# Patient Record
Sex: MALE | Race: WHITE | Employment: OTHER | URBAN - METROPOLITAN AREA
[De-identification: names, ages, dates, MRNs, and addresses within clinical notes are randomized per-mention and may not be internally consistent; named-entity substitution may affect disease eponyms.]

---

## 2019-06-26 PROBLEM — M17.11 OSTEOARTHRITIS OF RIGHT KNEE: Status: ACTIVE | Noted: 2019-06-26

## 2019-12-19 ENCOUNTER — HOSPITAL ENCOUNTER (OUTPATIENT)
Dept: PHYSICAL THERAPY | Age: 79
Discharge: HOME | End: 2019-12-19
Payer: MEDICARE

## 2019-12-19 PROCEDURE — 97161 PT EVAL LOW COMPLEX 20 MIN: CPT

## 2019-12-19 PROCEDURE — 97140 MANUAL THERAPY 1/> REGIONS: CPT

## 2019-12-19 PROCEDURE — 97110 THERAPEUTIC EXERCISES: CPT

## 2019-12-19 NOTE — PROGRESS NOTES
[address] [facility] AT [place] [address], [place], [address]  Phone: [phone]   [phone]  PLAN OF CARE / [address] [place] [address] PHYSICAL THERAPY SERVICES  Patient Name: Nghia Farnsworth : 1940   Medical   Diagnosis: Leg pain, right [M79.604] Treatment Diagnosis: Leg pain, right [M79.604]   Onset Date: 2019     Referral Source: Ciera Loredo MD Start of [facility]): 2019   Prior Hospitalization: See medical history Provider #: [id number]   Prior Level of Function: Unlimited tolerance for sitting/standing/driving   Comorbidities: Arthritis, R TKA 2019, L1-L5 fusion 2013, L TKA 1997, L5-S1 fusion   Medications: Verified on Patient Summary List   The Plan of Care and following information is based on the information from the initial evaluation.   ===========================================================================================  Assessment / key information:  Pt is a [de-identified]y.o. year old male with subjective complaints of R hip/LE pain including N/T that started after R TKA in 2019. Pt states pain is worse with sitting, standing, and driving. States worst pain occurs with sitting and causes him pain and difficulty with initial steps afterwards. Current pain is rated as 2 to 9/10; localized to posterior R hip with radiation down posterior leg to heel/toes. FOTO score= 38/100 indicating 62% impairment to functional activities. Today's evaulation is significant for   1) POSTURE/OBSERVATION: R anteriorly rotated innominate     2) ROM:  Lumbar grossly 75% all planes with pain reported into L SB and hamstring tightness noted into flexion. Hips/knees WFL  3) STRENGTH:  BLE's grossly 5/5. Bridge limited to 75% with calf cramping. Fair TA with H/L march. 4) SPECIAL TESTS:  (+) R SLR at 70 deg. (+) seated fwd flexion R SI, seated hip flexion b/l SI. 5) ADDITIONAL FINDINGS: noted R gluteal atrophy.     These findings are supportive for diagnosis of R sciatica and SI joint dysfunction. Pt will be a good candidate for skilled PT to address these impairments and promote return to normal ADLs and functional mobility for improved quality of life.    ===========================================================================================  Eval Complexity: History HIGH Complexity :3+ comorbidities / personal factors will impact the outcome/ POC ;  Examination  MEDIUM Complexity : 3 Standardized tests and measures addressing body structure, function, activity limitation and / or participation in recreation ; Presentation LOW Complexity : Stable, uncomplicated ;  Decision Making MEDIUM Complexity : FOTO score of 26-74; Overall Complexity LOW   Problem List: pain affecting function, decrease ROM, decrease strength, impaired gait/ balance, decrease ADL/ functional abilitiies, decrease activity tolerance, decrease flexibility/ joint mobility and decrease transfer abilities   Treatment Plan may include any combination of the following: Therapeutic exercise, Therapeutic activities, Neuromuscular re-education, Physical agent/modality, Gait/balance training, Manual therapy, Patient education, Self Care training, Functional mobility training, Home safety training and Stair training  Patient / Family readiness to learn indicated by: asking questions, trying to perform skills and interest  Persons(s) to be included in education: patient (P)  Barriers to Learning/Limitations: None  Measures taken, if barriers to learning:    Patient Goal (s): \"sciatic pain reduced\"   Patient self reported health status: good  Rehabilitation Potential: good   Short Term Goals: To be accomplished in  4  treatments:  1. Pt will be educated in appropriate HEP to decrease pain, increase ROM, increase strength and return pt to PLOF. 2. Pt will note pain less than or equal to 5/10 at worst to allow increase in functional abilities.    3. Pt will report at least 25% improvement in frequency/intensity of right LE radicular symptoms with ADL's.  Long Term Goals: To be accomplished in  8-12  treatments:  1. Pt will improve FOTO score to >/= 50 to demo a significant improvement in functional activity tolerance. 2. Pt will achieve >/= +5 GROC in order to promote increased activity tolerance. 3. Pt will demonstrate bridge to 100% for improved axial stability with standing ADL's.   4. Patient will report max pain </= 2/10 with sitting activities to promote improved tolerance for driving. Frequency / Duration:   Patient to be seen  1-2  times per week for 8-12  treatments:  Patient / Caregiver education and instruction: self care, activity modification and exercises  Therapist Signature: LACIE Solares PT Date: 23/63/1919   Certification Period: 12/19/19 to 3/17/20 Time: 11:07 AM   ===========================================================================================  I certify that the above Physical Therapy Services are being furnished while the patient is under my care. I agree with the treatment plan and certify that this therapy is necessary. Physician Signature:        Date:       Time:     Please sign and return to [facility] or you may fax the signed copy to [phone]. Thank you.

## 2019-12-19 NOTE — PROGRESS NOTES
PHYSICAL THERAPY - DAILY TREATMENT NOTE    Patient Name: Khushi Longoria        Date: 2019  : 1940   yes Patient  Verified  Visit #:   1     Insurance: Payor: Sunitha Michelle / Plan: [place] MEDICARE PART A & B / Product Type: Medicare /      In time: 10:20 Out time: 11:20   Total Treatment Time: 60     Medicare/BCBS Time Tracking (below)   Total Timed Codes (min):  60 1:1 Treatment Time:  60     TREATMENT AREA =  Leg pain, right [M79.604]    SUBJECTIVE  Pain Level (on 0 to 10 scale):  6  / 10   Medication Changes/New allergies or changes in medical history, any new surgeries or procedures?    no  If yes, update Summary List   Subjective Functional Status/Changes:  []  No changes reported     See POC          OBJECTIVE    See exam on chart for details on objective findings          12 min Therapeutic Exercise:  [x]  See flow sheet   Rationale:      increase ROM and increase strength to improve the patients ability to change/maintain body positions     12 min Manual Therapy: METs for pubic clearing and R anteriorly rotated innominate (R glute, L psoas)   Rationale:      decrease pain, increase ROM, increase tissue extensibility and decrease trigger points to improve patient's ability to change/maintain body positions        Billed With/As:   [x] TE   [] TA   [] Neuro   [] Self Care Patient Education: [x] Review HEP    [] Progressed/Changed HEP based on:   [] positioning   [] body mechanics   [] transfers   [] heat/ice application    [x] other: Review HEP, handout created and issued to pt. as per chart. Pt educated in hip anatomy, function and dysfunction as related to diagnosis. Reviewed POC and goals. Pt reports understanding.        Other Objective/Functional Measures:    See POC     Post Treatment Pain Level (on 0 to 10) scale:   1  / 10     ASSESSMENT  Assessment/Changes in Function:     See POC     []  See Progress Note/Recertification   Patient will continue to benefit from skilled PT services to modify and progress therapeutic interventions, address functional mobility deficits, address ROM deficits, address strength deficits, analyze and address soft tissue restrictions, analyze and cue movement patterns, analyze and modify body mechanics/ergonomics, assess and modify postural abnormalities and instruct in home and community integration to attain remaining goals. Progress toward goals / Updated goals:    See POC     PLAN  []  Upgrade activities as tolerated yes Continue plan of care   []  Discharge due to :    []  Other:      Therapist: Barber Couch.  Aishwarya Arevalo PT    Date: 12/19/2019 Time: 8:52 AM     Future Appointments   Date Time Provider Dae Davalos   12/23/2019 11:15 AM [facility] [address]   12/31/2019  7:00 AM [facility] [address]

## 2019-12-23 ENCOUNTER — HOSPITAL ENCOUNTER (OUTPATIENT)
Dept: PHYSICAL THERAPY | Age: 79
Discharge: HOME | End: 2019-12-23
Payer: MEDICARE

## 2019-12-23 PROCEDURE — 97140 MANUAL THERAPY 1/> REGIONS: CPT

## 2019-12-23 PROCEDURE — 97110 THERAPEUTIC EXERCISES: CPT

## 2019-12-23 NOTE — PROGRESS NOTES
PT DAILY TREATMENT NOTE     Patient Name: Froy Cotton  Date:2019  : 1940  [x]  Patient  Verified  Payor: [place] MEDICARE / Plan: [place] MEDICARE PART A & B / Product Type: Medicare /    In time:11:16  Out time:12:09  Total Treatment Time (min): 53  Total Timed Codes (min): 43  1:1 Treatment Time (min): 43   Visit #: 2 of     Treatment Area: Leg pain, right [M79.604]    SUBJECTIVE  Pain Level (0-10 scale): 2/10  Any medication changes, allergies to medications, adverse drug reactions, diagnosis change, or new procedure performed?: [x] No    [] Yes (see summary sheet for update)  Subjective functional status/changes:   [] No changes reported  I feel the most pain in my buttock and leg down to my big toe that gets worse the longer that I sit, I even have to lean away from my right side after sitting for only about 15 minutes.      OBJECTIVE  Modality rationale: decrease pain and increase tissue extensibility to improve the patients ability to improve functional abilities    Min Type Additional Details    [] Estim: []Att   []Unatt  []TENS instruct                 []IFC  []Premod []NMES                       []Other:  []w/US   []w/ice   []w/heat  Position:  Location:    []  Traction: [] Cervical       []Lumbar                       [] Prone          []Supine                       []Intermittent   []Continuous Lbs:  [] before manual  [] after manual    []  Ultrasound: []Continuous   [] Pulsed                           []1MHz   []3MHz Location:  W/cm2:    []  Iontophoresis with dexamethasone         Location: [] Take home patch   [] In clinic   10 []  Ice     [x]  heat  []  Ice massage Position:sidelying   Location:right glut/hip    []  Vasopneumatic Device Pressure: [] lo [] med [] hi   Temp: [] lo [] med [] hi   [] Skin assessment post-treatment:  []intact []redness- no adverse reaction       []redness - adverse reaction:       31 min Therapeutic Exercise:  [x] See flow sheet :   Rationale: increase ROM and increase strength to improve the patients ability to improve functional abilities    12 min Manual Therapy:  Long axis right LE distraction, manual piriformis stretching and stick rolling/deep elbow releases to right glut/piriformis in sidelying    Rationale: decrease pain, increase ROM, increase tissue extensibility and decrease trigger points to improve functional mobility       min Patient Education: [x] Review HEP    [] Progressed/Changed HEP based on:   [] positioning   [] body mechanics   [] transfers   [] heat/ice application        Other Objective/Functional Measures:     Pain Level (0-10 scale) post treatment: 0    ASSESSMENT/Changes in Function: Pt tolerated all new therex well upon trial today with chief c/o increased right glut/piriformis pain with intermittent radicular symptoms down to great toe with prolonged sitting >15 minutes. Pt needs the most focus on right glut/hip mobility as well as lumbopelvic/core stabilization. Pt did report increased benefit after manual intervention with minimal to no symptoms at end of treatment today. Will continue to progress/advance patient within current POC as tolerated with monitoring symptoms. Patient will continue to benefit from skilled PT services to modify and progress therapeutic interventions, address functional mobility deficits, address ROM deficits, address strength deficits, analyze and address soft tissue restrictions, analyze and cue movement patterns, analyze and modify body mechanics/ergonomics and assess and modify postural abnormalities to attain remaining goals. []  See Plan of Care  []  See progress note/recertification  []  See Discharge Summary         Progress towards goals / Updated goals: · Short Term Goals: To be accomplished in  4  treatments:  1. Pt will be educated in appropriate HEP to decrease pain, increase ROM, increase strength and return pt to OF.    2.  Pt will note pain less than or equal to 5/10 at worst to allow increase in functional abilities. 3. Pt will report at least 25% improvement in frequency/intensity of right LE radicular symptoms with ADL's.      · Long Term Goals: To be accomplished in  8-12  treatments:  1. Pt will improve FOTO score to >/= 50 to demo a significant improvement in functional activity tolerance. 2. Pt will achieve >/= +5 GROC in order to promote increased activity tolerance.   3. Pt will demonstrate bridge to 100% for improved axial stability with standing ADL's.   4. Patient will report max pain </= 2/10 with sitting activities to promote improved tolerance for driving.       PLAN  [x]  Upgrade activities as tolerated     []  Continue plan of care  []  Update interventions per flow sheet       []  Discharge due to:_  []  Other:_      Raúl Lynn PTA 12/23/2019  11:15 AM      Future Appointments   Date Time Provider Dae Davalos   12/31/2019  7:00 [facility]

## 2019-12-31 ENCOUNTER — HOSPITAL ENCOUNTER (OUTPATIENT)
Dept: PHYSICAL THERAPY | Age: 79
Discharge: HOME | End: 2019-12-31
Payer: MEDICARE

## 2019-12-31 PROCEDURE — 97110 THERAPEUTIC EXERCISES: CPT

## 2019-12-31 PROCEDURE — 97140 MANUAL THERAPY 1/> REGIONS: CPT

## 2019-12-31 NOTE — PROGRESS NOTES
PT DAILY TREATMENT NOTE     Patient Name: Anthony Vincent  Date:2019  : 1940  [x]  Patient  Verified  Payor: VA MEDICARE / Plan: VA MEDICARE PART A & B / Product Type: Medicare /    In time:7:00  Out time:8:13  Total Treatment Time (min): 73  Total Timed Codes (min): 63  1:1 Treatment Time (min): 45   Visit #: 3 of     Treatment Area: Leg pain, right [M79.604]    SUBJECTIVE  Pain Level (0-10 scale): 0  Any medication changes, allergies to medications, adverse drug reactions, diagnosis change, or new procedure performed?: [x] No    [] Yes (see summary sheet for update)  Subjective functional status/changes:   [] No changes reported  My leg felt better for pretty good for the rest of the day after I was here last time until everything went back to the same, but I guess that it is going to take a while to see a noticeable improvement.     OBJECTIVE  Modality rationale: decrease pain and increase tissue extensibility to improve the patients ability to improve functional mobility    Min Type Additional Details    [] Estim: []Att   []Unatt  []TENS instruct                 []IFC  []Premod []NMES                       []Other:  []w/US   []w/ice   []w/heat  Position:  Location:    []  Traction: [] Cervical       []Lumbar                       [] Prone          []Supine                       []Intermittent   []Continuous Lbs:  [] before manual  [] after manual    []  Ultrasound: []Continuous   [] Pulsed                           []1MHz   []3MHz Location:  W/cm2:    []  Iontophoresis with dexamethasone         Location: [] Take home patch   [] In clinic   10 []  Ice     [x]  heat  []  Ice massage Position: sidelying  Location:right glut/hip    []  Vasopneumatic Device Pressure: [] lo [] med [] hi   Temp: [] lo [] med [] hi   [] Skin assessment post-treatment:  []intact []redness- no adverse reaction       []redness - adverse reaction:       48 min Therapeutic Exercise:  [x] See flow sheet : Rationale: increase ROM and increase strength to improve the patients ability to improve functional abilities    15 min Manual Therapy:  Long axis right LE distraction, manual piriformis and sidelying hip flexor/quad combo stretching, stick rolling/deep elbow releases to right glut/piriformis in sidelying    Rationale: decrease pain, increase ROM, increase tissue extensibility and decrease trigger points to improve functional mobility           min Patient Education: [x] Review HEP    [] Progressed/Changed HEP based on:   [] positioning   [] body mechanics   [] transfers   [] heat/ice application        Other Objective/Functional Measures:     Pain Level (0-10 scale) post treatment: 0    ASSESSMENT/Changes in Function: Pt presented with increased relief for the remainder of the day following last treatment until symptoms returned to the same level. Pt was also able to advance to addition of mini band side steps as well as increasing to theraband resistance with sidelying clamshells with min to mod challenge today. Pt however, presented with noticeable restriction/limitation with manaul sidelying hip flexor/quad combo stretching with manual intervention today. Will continue to progress/advance patient within current POC as tolerated with monitoring symptoms. Patient will continue to benefit from skilled PT services to modify and progress therapeutic interventions, address functional mobility deficits, address ROM deficits, address strength deficits, analyze and address soft tissue restrictions, analyze and cue movement patterns, analyze and modify body mechanics/ergonomics and assess and modify postural abnormalities to attain remaining goals. []  See Plan of Care  []  See progress note/recertification  []  See Discharge Summary         Progress towards goals / Updated goals: · Short Term Goals:  To be accomplished in  4  treatments:  1.  Pt will be educated in appropriate HEP to decrease pain, increase ROM, increase strength and return pt to PLOF. 19: Met   2. Pt will note pain less than or equal to 5/10 at worst to allow increase in functional abilities. 3. Pt will report at least 25% improvement in frequency/intensity of right LE radicular symptoms with ADL's.      · Long Term Goals: To be accomplished in  8-12  treatments:  1. Pt will improve FOTO score to >/= 50 to demo a significant improvement in functional activity tolerance. 2. Pt will achieve >/= +5 GROC in order to promote increased activity tolerance. 3. Pt will demonstrate bridge to 100% for improved axial stability with standing ADL's.   4. Patient will report max pain </= 2/10 with sitting activities to promote improved tolerance for driving.     PLAN  [x]  Upgrade activities as tolerated     []  Continue plan of care  []  Update interventions per flow sheet       []  Discharge due to:_  []  Other:_      Antwan Hathaway PTA 2019  7:03 AM      Future Appointments   Date Time Provider Dae Davalos   1/3/2020 11:45 AM Steven Ruiz, PT [facility]   2020  9:45 AM Arletta Silver, PTA [facility]   2020 10:00 AM Arletta Silver, PTA [facility]   2020  2:15 PM Arletta Silver, PTA [facility]   2020  8:30 AM Arletta Silver, PTA [facility]   2020  9:45 AM Arletta Silver, PTA [facility]   2020  8:30 AM Arletta Silver, PTA [facility]   2020  9:30 AM Marcy Rocha., PT [facility]   2020 10:45 AM Arletta Silver, PTA [facility]

## 2020-01-03 ENCOUNTER — HOSPITAL ENCOUNTER (OUTPATIENT)
Dept: PHYSICAL THERAPY | Age: 80
Discharge: HOME | End: 2020-01-03
Payer: MEDICARE

## 2020-01-03 PROCEDURE — 97140 MANUAL THERAPY 1/> REGIONS: CPT

## 2020-01-03 NOTE — PROGRESS NOTES
PHYSICAL THERAPY - DAILY TREATMENT NOTE    Patient Name: Katie Villa        Date: 1/3/2020  : 1940   yes Patient  Verified  Visit #:   4     Insurance: Payor: Corona Michelle / Plan: [place] MEDICARE PART A & B / Product Type: Medicare /      In time: 11:48 Out time: 12:39   Total Treatment Time: 51     Medicare/BCBS Time Tracking (below)   Total Timed Codes (min):  41 1:1 Treatment Time:  15     TREATMENT AREA =  Leg pain, right [M79.604]    SUBJECTIVE  Pain Level (on 0 to 10 scale):  3-4  / 10   Medication Changes/New allergies or changes in medical history, any new surgeries or procedures?    no  If yes, update Summary List   Subjective Functional Status/Changes:  []  No changes reported     Pt reports no significant change in his sx thus far. Reports relief after sessions lasting that day but wakes w/ sx returned to his normal.           OBJECTIVE  Modalities Rationale:     decrease pain and increase tissue extensibility to improve patient's ability to complete ADLs   min [] Estim, type/location:                                      []  att     []  unatt     []  w/US     []  w/ice    []  w/heat    min []  Mechanical Traction: type/lbs                   []  pro   []  sup   []  int   []  cont    []  before manual    []  after manual    min []  Ultrasound, settings/location:      min []  Iontophoresis w/ dexamethasone, location:                                               []  take home patch       []  in clinic   10 min []  Ice     [x]  Heat    location/position:  To the R hip in L SL    min []  Vasopneumatic Device, press/temp:     min []  Other:    [x] Skin assessment post-treatment (if applicable):    [x]  intact    []  redness- no adverse reaction     []redness - adverse reaction:        31 min Therapeutic Exercise:  [x]  See flow sheet   Rationale:      increase ROM and increase strength to improve the patients ability to ambulate, transfer     10 min Manual Therapy: STM/TPR to R piriformis and glute med, MET for R ant innom, supine sciatic n glides   Rationale:      decrease pain, increase ROM, increase tissue extensibility and decrease trigger points to improve patient's ability to complete ADLs    Billed With/As:   [x] TE   [] TA   [] Neuro   [] Self Care Patient Education: [x] Review HEP    [] Progressed/Changed HEP based on:   [] positioning   [] body mechanics   [] transfers   [] heat/ice application    [] other:      Other Objective/Functional Measures:    5' 1:1 TE (NC)    (+) SLR at 70 deg  ttp to R glute med     Post Treatment Pain Level (on 0 to 10) scale:   2  / 10     ASSESSMENT  Assessment/Changes in Function:     Sx slightly reduced post tx session. Advised continuation of HEP as rx in order to progress sx     []  See Progress Note/Recertification   Patient will continue to benefit from skilled PT services to modify and progress therapeutic interventions, address functional mobility deficits, address ROM deficits, address strength deficits, analyze and address soft tissue restrictions, analyze and cue movement patterns, analyze and modify body mechanics/ergonomics, assess and modify postural abnormalities and instruct in home and community integration to attain remaining goals. Progress toward goals / Updated goals: · Short Term Goals: To be accomplished in  4  treatments:  1.  Pt will be educated in appropriate HEP to decrease pain, increase ROM, increase strength and return pt to PLOF. 12/31/19: Met   2. Pt will note pain less than or equal to 5/10 at worst to allow increase in functional abilities. 3. Pt will report at least 25% improvement in frequency/intensity of right LE radicular symptoms with ADL's. 1/3/20: goal not met, pt notes no improvement in sx     · Long Term Goals: To be accomplished in  8-12  treatments:  1. Pt will improve FOTO score to >/= 50 to demo a significant improvement in functional activity tolerance.   2. Pt will achieve >/= +5 GROC in order to promote increased activity tolerance. 3. Pt will demonstrate bridge to 100% for improved axial stability with standing ADL's.   4. Patient will report max pain </= 2/10 with sitting activities to promote improved tolerance for driving.      PLAN  []  Upgrade activities as tolerated yes Continue plan of care   []  Discharge due to :    []  Other:      Therapist: Taisha León, PT    Date: 1/3/2020 Time: 12:47 PM     Future Appointments   Date Time Provider Dae Davalos   1/6/2020  9:45 AM Ladene Furry, PTA [facility] [address]   1/9/2020 10:00 AM Ladene Furry, PTA [facility] [address]   1/13/2020  2:15 PM Ladene Furry, PTA [facility] [address]   1/16/2020  8:30 AM Ladene Furry, PTA [facility] [address]   1/20/2020  9:45 AM Ladene Furry, PTA [facility] [address]   1/23/2020  8:30 AM Ladene Furry, PTA [facility] [address]   1/27/2020  9:30 AM Mandyleia Gunderson., PT [facility] [address]   1/30/2020 10:45 AM Ladene Furry, PTA [facility] [address]

## 2020-01-06 ENCOUNTER — HOSPITAL ENCOUNTER (OUTPATIENT)
Dept: PHYSICAL THERAPY | Age: 80
Discharge: HOME | End: 2020-01-06
Payer: MEDICARE

## 2020-01-06 PROCEDURE — 97140 MANUAL THERAPY 1/> REGIONS: CPT

## 2020-01-06 PROCEDURE — 97110 THERAPEUTIC EXERCISES: CPT

## 2020-01-06 NOTE — PROGRESS NOTES
PT DAILY TREATMENT NOTE     Patient Name: Ese Colby  Date:2020  : 1940  [x]  Patient  Verified  Payor: [place] MEDICARE / Plan: [place] MEDICARE PART A & B / Product Type: Medicare /    In time:9:47  Out time:10:56  Total Treatment Time (min): 69  Total Timed Codes (min): 59  1:1 Treatment Time (min): 43   Visit #: 5 of     Treatment Area: Leg pain, right [M79.604]    SUBJECTIVE  Pain Level (0-10 scale): 0  Any medication changes, allergies to medications, adverse drug reactions, diagnosis change, or new procedure performed?: [x] No    [] Yes (see summary sheet for update)  Subjective functional status/changes:   [] No changes reported  My back and leg is feeling pretty good today, I really haven't had any pain to speak, but I did have the usual pain over the weekend. I also feel like my right leg wants to buckle or give out when I am going up the stairs.      OBJECTIVE  Modality rationale: decrease pain and increase tissue extensibility to improve the patients ability to improve functional abilities   Min Type Additional Details    [] Estim: []Att   []Unatt  []TENS instruct                 []IFC  []Premod []NMES                       []Other:  []w/US   []w/ice   []w/heat  Position:  Location:    []  Traction: [] Cervical       []Lumbar                       [] Prone          []Supine                       []Intermittent   []Continuous Lbs:  [] before manual  [] after manual    []  Ultrasound: []Continuous   [] Pulsed                           []1MHz   []3MHz Location:  W/cm2:    []  Iontophoresis with dexamethasone         Location: [] Take home patch   [] In clinic   10 []  Ice     [x]  heat  []  Ice massage Position:sidelying   Location:right glut/hip    []  Vasopneumatic Device Pressure: [] lo [] med [] hi   Temp: [] lo [] med [] hi   [] Skin assessment post-treatment:  []intact []redness- no adverse reaction       []redness - adverse reaction:       44 min Therapeutic Exercise:  [x] See flow sheet :   Rationale: increase ROM and increase strength to improve the patients ability to improve functional abilities     15 min Manual Therapy:  Long axis right LE distraction, manual piriformis stretching, stick rolling/deep elbow releases to right glut/piriformis in sidelying    Rationale: decrease pain, increase ROM, increase tissue extensibility and decrease trigger points to improve functional mobility         min Patient Education: [x] Review HEP    [] Progressed/Changed HEP based on:   [] positioning   [] body mechanics   [] transfers   [] heat/ice application        Other Objective/Functional Measures:     Pain Level (0-10 scale) post treatment: 0    ASSESSMENT/Changes in Function: Pt presented with minimal right lumbosacral or LE radicular pain today, but chief c/o intermittent R LE instability/buckling especially with ascending stairs since last treatment. LE weakness/instability was addressed with addition of SLR's as well as ability to tolerate increased resistance with miniband side stepping and addition of level 1 dead bug stabs with min to mod challenge today. Will continue to progress/advance patient within current POC as tolerated with monitoring symptoms. Patient will continue to benefit from skilled PT services to modify and progress therapeutic interventions, address functional mobility deficits, address ROM deficits, address strength deficits, analyze and address soft tissue restrictions, analyze and cue movement patterns, analyze and modify body mechanics/ergonomics and assess and modify postural abnormalities to attain remaining goals. []  See Plan of Care  []  See progress note/recertification  []  See Discharge Summary         Progress towards goals / Updated goals: · Short Term Goals: To be accomplished in  4  treatments:  1.  Pt will be educated in appropriate HEP to decrease pain, increase ROM, increase strength and return pt to PLOF. 12/31/19: Met   2.  Pt will note pain less than or equal to 5/10 at worst to allow increase in functional abilities. 3. Pt will report at least 25% improvement in frequency/intensity of right LE radicular symptoms with ADL's. 1/3/20: goal not met, pt notes no improvement in sx     · Long Term Goals: To be accomplished in  8-12  treatments:  1. Pt will improve FOTO score to >/= 50 to demo a significant improvement in functional activity tolerance. 2. Pt will achieve >/= +5 GROC in order to promote increased activity tolerance. 3. Pt will demonstrate bridge to 100% for improved axial stability with standing ADL's.   4. Patient will report max pain </= 2/10 with sitting activities to promote improved tolerance for driving.     PLAN  [x]  Upgrade activities as tolerated     []  Continue plan of care  []  Update interventions per flow sheet       []  Discharge due to:_  []  Other:_      Anna Magaña PTA 1/6/2020  9:51 AM      Future Appointments   Date Time Provider Dae Davalos   1/9/2020 10:00 AM Bonnita Kussmaul, PTA [facility]   1/13/2020  2:15 PM Bonnita Kussmaul, PTA [facility]   1/16/2020  8:30 AM Bonnita Kussmaul, PTA [facility]   1/20/2020  9:45 AM Bonnita Kussmaul, PTA [facility]   1/23/2020  8:30 AM Bonnita Kussmaul, PTA [facility]   1/27/2020  9:30 AM Kamaljit Ford, PT [facility]   1/30/2020 10:45 AM Bonnita Kussmaul, PTA [facility]

## 2020-01-09 ENCOUNTER — HOSPITAL ENCOUNTER (OUTPATIENT)
Dept: PHYSICAL THERAPY | Age: 80
Discharge: HOME | End: 2020-01-09
Payer: MEDICARE

## 2020-01-09 PROCEDURE — 97110 THERAPEUTIC EXERCISES: CPT

## 2020-01-09 PROCEDURE — 97140 MANUAL THERAPY 1/> REGIONS: CPT

## 2020-01-09 NOTE — PROGRESS NOTES
PHYSICAL THERAPY - DAILY TREATMENT NOTE    Patient Name: Jeimy Rader        Date: 2020  : 1940   yes Patient  Verified  Visit #:     Insurance: Payor: [employer] / Plan: [place] MEDICARE PART A & B / Product Type: Medicare /      In time: 9:55 AM Out time: 11:08   Total Treatment Time: 73     Medicare/BCBS Time Tracking (below)   Total Timed Codes (min):  63 1:1 Treatment Time:  63     TREATMENT AREA =  Leg pain, right [M79.604]    SUBJECTIVE  Pain Level (on 0 to 10 scale):  5  / 10- R knee   Medication Changes/New allergies or changes in medical history, any new surgeries or procedures?    no  If yes, update Summary List   Subjective Functional Status/Changes:  []  No changes reported     Pt c/o significant increase in pain 2 days ago without known cause. Pt states pain was up to 8/10 and he had a lot of pain and difficulty trying to lie down to sleep. States his R knee has been more irritated since last session and weak to ascend stairs. Pt notes constant numbness to R great toe and 2nd toe; unchanged since SOC.   Sitting tolerance ~ 5-10 min firm surface; up to 30 min in movie theatre before increased R buttock pain and has to get up         OBJECTIVE  Modalities Rationale:     decrease pain and increase tissue extensibility to improve patient's ability to perform functional mobility   min [] Estim, type/location:                                      []  att     []  unatt     []  w/US     []  w/ice    []  w/heat    min []  Mechanical Traction: type/lbs                   []  pro   []  sup   []  int   []  cont    []  before manual    []  after manual    min []  Ultrasound, settings/location:      min []  Iontophoresis w/ dexamethasone, location:                                               []  take home patch       []  in clinic    min []  Ice     []  Heat    location/position:     min []  Vasopneumatic Device, press/temp:     min []  Other:    [] Skin assessment post-treatment (if applicable):    []  intact    []  redness- no adverse reaction     []redness - adverse reaction:        51 min Therapeutic Exercise:  [x]  See flow sheet   Rationale:      increase ROM and increase strength to improve the patients ability to tolerate functional mobility/ADL's     12 min Manual Therapy: STM R l/s paraspinals, piriformis, iliopsoas. Manual P/A hip mobs, Hip IR stretching, quad stretching. METs for pubic clearing, R anteriorly rotated innominate (R glute, L psoas)   Rationale:      decrease pain, increase ROM, increase tissue extensibility and decrease trigger points to improve patient's ability to perform functional mobility        Billed With/As:   [x] TE   [] TA   [] Neuro   [] Self Care Patient Education: [x] Review HEP    [] Progressed/Changed HEP based on:   [] positioning   [] body mechanics   [] transfers   [] heat/ice application    [] other:      Other Objective/Functional Measures:  SI check:  R anteriorly rotated innominate  (-) SLR, (+) 90-90 hams    -progressed to DB Lv2  -increased reps with clams  -added step ups to improve tolerance for stairs     Post Treatment Pain Level (on 0 to 10) scale:   3  / 10     ASSESSMENT  Assessment/Changes in Function:     Good correction for SI alignment achieved with MET's; pt noting reduced knee pain from 5/10 to 3/10 on standing/walking. Pt has been unable to achieve any change to radicular sx's as of yet with PT. Able to advance to step ups without adverse sx's; plan reassess tolerance at NV with progression to HEP as appropriate.      []  See Progress Note/Recertification   Patient will continue to benefit from skilled PT services to modify and progress therapeutic interventions, address functional mobility deficits, address ROM deficits, address strength deficits, analyze and address soft tissue restrictions, analyze and cue movement patterns, analyze and modify body mechanics/ergonomics, assess and modify postural abnormalities and instruct in home and community integration to attain remaining goals. Progress toward goals / Updated goals: · Short Term Goals: To be accomplished in  4  treatments:  1.  Pt will be educated in appropriate HEP to decrease pain, increase ROM, increase strength and return pt to PLOF. 12/31/19: Met   2. Pt will note pain less than or equal to 5/10 at worst to allow increase in functional abilities. -1/9: goals not met; max pain 8/10 last night  3. Pt will report at least 25% improvement in frequency/intensity of right LE radicular symptoms with ADL's. 1/9/20: goal not met, pt notes no improvement in sx     · Long Term Goals: To be accomplished in  8-12  treatments:  1. Pt will improve FOTO score to >/= 50 to demo a significant improvement in functional activity tolerance. 2. Pt will achieve >/= +5 GROC in order to promote increased activity tolerance. 3. Pt will demonstrate bridge to 100% for improved axial stability with standing ADL's.   4. Patient will report max pain </= 2/10 with sitting activities to promote improved tolerance for driving     PLAN  []  Upgrade activities as tolerated yes Continue plan of care   []  Discharge due to :    []  Other:      Therapist: Nixon Henning.  Delmar Mcdaniel, PT    Date: 1/9/2020 Time: 10:09 AM     Future Appointments   Date Time Provider Dae Davalos   1/13/2020  2:15 PM Azra Linda [facility]   1/16/2020  8:30 AM Pheobe Clear, PTA [facility]   1/20/2020  9:45 AM Pheobe Clear, PTA [facility]   1/23/2020  8:30 AM Pheobe Clear, PTA [facility]   1/27/2020  9:30 AM Elodia Mejias, PT [facility]   1/30/2020 10:45 AM Pheobe Clear, PTA [facility]

## 2020-01-13 ENCOUNTER — HOSPITAL ENCOUNTER (OUTPATIENT)
Dept: PHYSICAL THERAPY | Age: 80
Discharge: HOME | End: 2020-01-13
Payer: MEDICARE

## 2020-01-13 PROCEDURE — 97110 THERAPEUTIC EXERCISES: CPT

## 2020-01-13 PROCEDURE — 97140 MANUAL THERAPY 1/> REGIONS: CPT

## 2020-01-16 ENCOUNTER — HOSPITAL ENCOUNTER (OUTPATIENT)
Dept: PHYSICAL THERAPY | Age: 80
Discharge: HOME | End: 2020-01-16
Payer: MEDICARE

## 2020-01-16 PROCEDURE — 97110 THERAPEUTIC EXERCISES: CPT

## 2020-01-16 PROCEDURE — 97140 MANUAL THERAPY 1/> REGIONS: CPT

## 2020-01-16 NOTE — PROGRESS NOTES
PT DAILY TREATMENT NOTE     Patient Name: Jelena Álvarez  Date:2020  : 1940  [x]  Patient  Verified  Payor: [place] MEDICARE / Plan: [place] MEDICARE PART A & B / Product Type: Medicare /    In time:8:31  Out time:10:12  Total Treatment Time (min): 101  Total Timed Codes (min): 91  1:1 Treatment Time (min): 89  Visit #: 8 of     Treatment Area: Leg pain, right [M79.604]    SUBJECTIVE  Pain Level (0-10 scale): 3/10  Any medication changes, allergies to medications, adverse drug reactions, diagnosis change, or new procedure performed?: [x] No    [] Yes (see summary sheet for update)  Subjective functional status/changes:   [] No changes reported  I had a bad day yesterday from having to drive for an hour and a half total, I couldn't even sit that long at all for the rest of the day after that.      OBJECTIVE  Modality rationale: decrease pain and increase tissue extensibility to improve the patients ability to improve functionalk abilities   Min Type Additional Details    [] Estim: []Att   []Unatt  []TENS instruct                 []IFC  []Premod []NMES                       []Other:  []w/US   []w/ice   []w/heat  Position:  Location:    []  Traction: [] Cervical       []Lumbar                       [] Prone          []Supine                       []Intermittent   []Continuous Lbs:  [] before manual  [] after manual    []  Ultrasound: []Continuous   [] Pulsed                           []1MHz   []3MHz Location:  W/cm2:    []  Iontophoresis with dexamethasone         Location: [] Take home patch   [] In clinic   10 []  Ice     [x]  heat  []  Ice massage Position: sidelying  Location:    []  Vasopneumatic Device Pressure: [] lo [] med [] hi   Temp: [] lo [] med [] hi   [] Skin assessment post-treatment:  []intact []redness- no adverse reaction       []redness - adverse reaction:       76 min Therapeutic Exercise:  [x] See flow sheet :   Rationale: increase ROM and increase strength to improve the patients ability to improve functional abilities    15 min Manual Therapy:   Long axis right LE distraction, manual piriformis stretching,Mulligan belt lateral hip mobs and deep elbow releases to right glut/piriformis in sidelying    Rationale: decrease pain, increase ROM, increase tissue extensibility and decrease trigger points to improve functional mobility         min Patient Education: [x] Review HEP    [] Progressed/Changed HEP based on:   [] positioning   [] body mechanics   [] transfers   [] heat/ice application        Other Objective/Functional Measures:     Pain Level (0-10 scale) post treatment: 0    ASSESSMENT/Changes in Function:     Patient will continue to benefit from skilled PT services to modify and progress therapeutic interventions, address functional mobility deficits, address ROM deficits, address strength deficits, analyze and address soft tissue restrictions, analyze and cue movement patterns, analyze and modify body mechanics/ergonomics and assess and modify postural abnormalities to attain remaining goals. []  See Plan of Care  [x]  See progress note/recertification  []  See Discharge Summary         Progress towards goals / Updated goals:  See Progress note/Physician update for full detailed progress towards established goals.     PLAN  [x]  Upgrade activities as tolerated     []  Continue plan of care  []  Update interventions per flow sheet       []  Discharge due to:_  []  Other:_      Alessio Gant PTA 1/16/2020  8:57 AM      Future Appointments   Date Time Provider Dae Davalos   1/20/2020  9:45 AM Florene Fall, PTA [facility]   1/23/2020  8:30 AM Florene Fall, PTA [facility]   1/27/2020  9:30 AM Windsor Night., PT [facility]   1/30/2020 10:45 AM Florene Fall, PTA [facility]

## 2020-01-16 NOTE — PROGRESS NOTES
[facility] PHYSICAL THERAPY  [address], [place], [address] - Phone: [phone]  Fax: [phone]  [address] [place] [address] PHYSICAL THERAPY          Patient Name: Ailyn Sharma : 1940   Treatment/Medical Diagnosis: Leg pain, right [M79.604]   Onset Date: 2019    Referral Source: Ranjit Hernandez MD [facility]): 2019   Prior Hospitalization: See Medical History Provider #: [id number]   Prior Level of Function: Unlimited tolerance for sitting/standing/driving   Comorbidities: Arthritis, R TKA 2019, L1-L5 fusion 2013, L TKA 1997, L5-S1 fusion   Medications: Verified on Patient Summary List   Vis[facility] from [facility]: 8 Missed Visits: 0     Goal/Measure of Progress Goal Met? 1. Pt will note pain less than or equal to 5/10 at worst to allow increase in functional abilities. Status at last Eval: 9/10 at the worst with prolonged sitting especially with driving >03-88 minutes Current Status: 9/10 at the worst with prolonged sitting especially with driving >89-73 minutes no   2. Pt will report at least 25% improvement in frequency/intensity of right LE radicular symptoms with ADL's. Status at last Eval: Progressing  Current Status: No improvement, actually worse with periods of prolonged sitting >10-15 minutes no   3. Pt will improve FOTO score to >/= 50 to demo a significant improvement in functional activity tolerance   Status at last Eval: 38/100 Current Status: 48/100 no     Goal/Measure of Progress Goal Met? 4. Pt will achieve >/= +5 GROC in order to promote increased activity tolerance. Status at last Eval:  Current Status:  +2 no   5. Pt will demonstrate bridge to 100% for improved axial stability with standing ADL's.     Status at last Eval: Progressing  Current Status: demonstrates bridge at approximately 75% with mild left hip shift no     Key Functional Changes/Progress:   Patient reports approximately 25% overall improvement from therapy since initial evaluation with 5/10 pain level on average,increased to 9/10 at the worst with prolonged sitting especially with driving >09-60 minutes. Pt is making slow but steady progress with gaining R lumbopelvic and hip mobility as well as advancing with strengthening and stabilization within current POC. Pt also presents with negative SLR, slump and SI joint torsion tests, which were all positive at initial evaluation. Pt would benefit from continued therapy for 4 remaining visits left on current script to achieve maximum medical benefit/potential from current POC. Will continue to progress/advance patient within current POC as tolerated with monitoring symptoms. R hip strength measurements/MMT= Flexion=4-/5; Abduction=4/5; Extension=4-/5; gluts=4-/5 (increased pain with hip extension and glut testing)  Problem List: pain affecting function, decrease ROM, decrease strength, impaired gait/ balance, decrease ADL/ functional abilitiies, decrease activity tolerance, decrease flexibility/ joint mobility and decrease transfer abilities   Treatment Plan may include any combination of the following: Therapeutic exercise, Therapeutic activities, Neuromuscular re-education, Physical agent/modality, Gait/balance training, Manual therapy, Patient education, Self Care training, Functional mobility training, Home safety training and Stair training  Patient Goal(s) has been updated and includes:  \"I want to be able to sit and drive as much as I need to with less pain in my buttock and leg\"    Goals for this certification period include and are to be achieved in   4  treatments: 1. Pt will note pain less than or equal to 5/10 at worst to allow improved tolerance for driving. 2.Pt will report at least 25% improvement in frequency/intensity of right LE radicular symptoms with ADL's.    3.Pt will improve FOTO score to >/= 50 to demo a significant improvement in functional activity tolerance  4. Pt will achieve >/= +5 GROC in order to promote increased activity tolerance. 5.Pt will demonstrate bridge to 100% for improved axial stability with standing ADL's. Frequency / Duration:   Patient to be seen   1-2   times per week for   4    treatments:    Assessments/Recommendations:   Continue with current POC for 4 remaining visits left on current script with advancing as tolerated, then reassess for the need for continuation or discharge from therapy. If you have any questions/comments please contact us directly at [phone]. Thank you for allowing us to assist in the care of your patient. Therapist Signature: Beth Clark PTA/   KRISTIN Nieves Date: 4/55/9556   Certification Period:  Reporting Period: 12/19/19 to 3/17/20  12/19/19-1/16/20 Time: 9:03 AM   NOTE TO PHYSICIAN:  PLEASE COMPLETE THE ORDERS BELOW AND FAX TO   [facility]: [phone]  If you are unable to process this request in 24 hours please contact our office: [phone]    ___ I have read the above report and request that my patient continue as recommended.   ___ I have read the above report and request that my patient continue therapy with the following changes/special instructions: ________________________________________________   ___ I have read the above report and request that my patient be discharged from therapy.      Physician Signature:        Date:       Time:

## 2020-01-20 ENCOUNTER — HOSPITAL ENCOUNTER (OUTPATIENT)
Dept: PHYSICAL THERAPY | Age: 80
Discharge: HOME | End: 2020-01-20
Payer: MEDICARE

## 2020-01-20 PROCEDURE — 97110 THERAPEUTIC EXERCISES: CPT

## 2020-01-20 PROCEDURE — 97140 MANUAL THERAPY 1/> REGIONS: CPT

## 2020-01-20 NOTE — PROGRESS NOTES
PT DAILY TREATMENT NOTE     Patient Name: Anthony Vincent  Date:2020  : 1940  [x]  Patient  Verified  Payor: [place] MED[place]ARE / Plan: [place] MEDICARE PART A & B / Product Type: Medicare /    In time:9:46  Out time:11:04  Total Treatment Time (min): 78  Total Timed Codes (min): 68  1:1 Treatment Time (min): 45   Visit #: 9 of 12    Treatment Area: Leg pain, right [M79.604]    SUBJECTIVE  Pain Level (0-10 scale): 2/10  Any medication changes, allergies to medications, adverse drug reactions, diagnosis change, or new procedure performed?: [x] No    [] Yes (see summary sheet for update)  Subjective functional status/changes:   [] No changes reported  I had a really good weekend, I really didn't have any flare ups to speak of. Although it hurt while you were doing it, I think that you hit the high spot with your elbow when you were working on me on Thursday.     OBJECTIVE  Modality rationale: decrease pain and increase tissue extensibility to improve the patients ability to improve functional abilities    Min Type Additional Details    [] Estim: []Att   []Unatt  []TENS instruct                 []IFC  []Premod []NMES                       []Other:  []w/US   []w/ice   []w/heat  Position:  Location:    []  Traction: [] Cervical       []Lumbar                       [] Prone          []Supine                       []Intermittent   []Continuous Lbs:  [] before manual  [] after manual    []  Ultrasound: []Continuous   [] Pulsed                           []1MHz   []3MHz Location:  W/cm2:    []  Iontophoresis with dexamethasone         Location: [] Take home patch   [] In clinic   10 []  Ice     [x]  heat  []  Ice massage Position:sidelying   Location:right glut    []  Vasopneumatic Device Pressure: [] lo [] med [] hi   Temp: [] lo [] med [] hi   [] Skin assessment post-treatment:  []intact []redness- no adverse reaction       []redness - adverse reaction:       53 min Therapeutic Exercise:  [x] See flow sheet : Rationale: increase ROM and increase strength to improve the patients ability to improve functional abilities     15 min Manual Therapy:   Long axis right LE distraction, manual piriformis and sidelying hip flexor/quad combo stretching and deep elbow releases to right glut/piriformis in sidelying    Rationale: decrease pain, increase ROM, increase tissue extensibility and decrease trigger points to improve functional mobility           min Patient Education: [x] Review HEP    [] Progressed/Changed HEP based on:   [] positioning   [] body mechanics   [] transfers   [] heat/ice application        Other Objective/Functional Measures:     Pain Level (0-10 scale) post treatment: 2/1053    ASSESSMENT/Changes in Function: Pt reports minimal reoccurrence/excerbation of R glut/LE radicular symptoms since last treatment. Pt was also able to advance to addition of seated stability ball bridges and marches and sidelying hip abduction with min to mod challenge today. Will continue to progress/advance patient within current POC as tolerated with monitoring symptoms. Patient will continue to benefit from skilled PT services to modify and progress therapeutic interventions, address functional mobility deficits, address ROM deficits, address strength deficits, analyze and address soft tissue restrictions, analyze and cue movement patterns, analyze and modify body mechanics/ergonomics and assess and modify postural abnormalities to attain remaining goals. []  See Plan of Care  []  See progress note/recertification  []  See Discharge Summary         Progress towards goals / Updated goals:  · Goals for this certification period include and are to be achieved in   4  treatments: 1. Pt will note pain less than or equal to 5/10 at worst to allow improved tolerance for driving.   2.Pt will report at least 25% improvement in frequency/intensity of right LE radicular symptoms with ADL's.   3.Pt will improve FOTO score to >/= 50 to demo a significant improvement in functional activity tolerance  4. Pt will achieve >/= +5 GROC in order to promote increased activity tolerance.   5.Pt will demonstrate bridge to 100% for improved axial stability with standing ADL's.        PLAN  [x]  Upgrade activities as tolerated     []  Continue plan of care  []  Update interventions per flow sheet       []  Discharge due to:_  []  Other:_      Monica Saul PTA 1/20/2020  9:45 AM      Future Appointments   Date Time Provider Dae Davalos   1/23/2020  8:30 AM Valery Schofield PTA [facility]   1/27/2020  9:30 AM Rigoberto Menard, PT [facility]   1/30/2020 10:45 AM Valery Schofield PTA [facility]

## 2020-01-23 ENCOUNTER — HOSPITAL ENCOUNTER (OUTPATIENT)
Dept: PHYSICAL THERAPY | Age: 80
Discharge: HOME | End: 2020-01-23
Payer: MEDICARE

## 2020-01-23 PROCEDURE — 97110 THERAPEUTIC EXERCISES: CPT

## 2020-01-23 PROCEDURE — 97140 MANUAL THERAPY 1/> REGIONS: CPT

## 2020-01-23 NOTE — PROGRESS NOTES
PT DAILY TREATMENT NOTE     Patient Name: Ese Colby  Date:2020  : 1940  [x]  Patient  Verified  Payor: [place] MEDICARE / Plan: [place] MEDICARE PART A & B / Product Type: Medicare /    In time:8:30  Out time:9:55  Total Treatment Time (min): 85  Total Timed Codes (min): 75  1:1 Treatment Time (min): 45   Visit #: 10 of 12    Treatment Area: Leg pain, right [M79.604]    SUBJECTIVE  Pain Level (0-10 scale): 2/10  Any medication changes, allergies to medications, adverse drug reactions, diagnosis change, or new procedure performed?: [x] No    [] Yes (see summary sheet for update)  Subjective functional status/changes:   [] No changes reported  My right was hurting to the point that I was limping up until yesterday after that new stretch that you did on me laying on my side the last time that I was here, so I don't think that I want to do that one again today.     OBJECTIVE  Modality rationale: decrease pain and increase tissue extensibility to improve the patients ability to improve functional abilities   Min Type Additional Details    [] Estim: []Att   []Unatt  []TENS instruct                 []IFC  []Premod []NMES                       []Other:  []w/US   []w/ice   []w/heat  Position:  Location:    []  Traction: [] Cervical       []Lumbar                       [] Prone          []Supine                       []Intermittent   []Continuous Lbs:  [] before manual  [] after manual    []  Ultrasound: []Continuous   [] Pulsed                           []1MHz   []3MHz Location:  W/cm2:    []  Iontophoresis with dexamethasone         Location: [] Take home patch   [] In clinic   10 []  Ice     [x]  heat  []  Ice massage Position:sidelying   Location:right glut    []  Vasopneumatic Device Pressure: [] lo [] med [] hi   Temp: [] lo [] med [] hi   [] Skin assessment post-treatment:  []intact []redness- no adverse reaction       []redness - adverse reaction:       60 min Therapeutic Exercise:  [x] See flow sheet :   Rationale: increase ROM and increase strength to improve the patients ability to improve functional abilities     15 min Manual Therapy:   Long axis right LE distraction, manual piriformis stretching and deep elbow releases/stick rolling to right glut/piriformis in sidelying    Rationale: decrease pain, increase ROM, increase tissue extensibility and decrease trigger points to improve functional mobility            min Patient Education: [x] Review HEP    [] Progressed/Changed HEP based on:   [] positioning   [] body mechanics   [] transfers   [] heat/ice application        Other Objective/Functional Measures:     Pain Level (0-10 scale) post treatment: 0    ASSESSMENT/Changes in Function: Pt presented with chief c/o increased prolonged R knee pain after performing manual sidelying hip flexor/quad combo stretching last treatment which was held today. Pt was however able to tolerate full normal therex regiment including addition of seated stability ball stabs with limited lumbopelvic mobility with min to mod challenge today. Will continue to progress/advance patient within current POC as tolerated with monitoring symptoms. Patient will continue to benefit from skilled PT services to modify and progress therapeutic interventions, address functional mobility deficits, address ROM deficits, address strength deficits, analyze and address soft tissue restrictions, analyze and cue movement patterns, analyze and modify body mechanics/ergonomics and assess and modify postural abnormalities to attain remaining goals. []  See Plan of Care  []  See progress note/recertification  []  See Discharge Summary         Progress towards goals / Updated goals:  · Goals for this certification period include and are to be achieved in   4  treatments: 1. Pt will note pain less than or equal to 5/10 at worst to allow improved tolerance for driving.   2.Pt will report at least 25% improvement in frequency/intensity of right LE radicular symptoms with ADL's.   3.Pt will improve FOTO score to >/= 50 to demo a significant improvement in functional activity tolerance  4. Pt will achieve >/= +5 GROC in order to promote increased activity tolerance.   5.Pt will demonstrate bridge to 100% for improved axial stability with standing ADL's    PLAN  [x]  Upgrade activities as tolerated     []  Continue plan of care  []  Update interventions per flow sheet       []  Discharge due to:_  []  Other:_      Radha Arita PTA 1/23/2020  8:39 AM      Future Appointments   Date Time Provider Dae Davalos   1/27/2020  9:30 AM Aquiles Joshi, PT [facility]   1/30/2020 10:45 AM Jae Mendoza PTA [facility]

## 2020-01-27 ENCOUNTER — HOSPITAL ENCOUNTER (OUTPATIENT)
Dept: PHYSICAL THERAPY | Age: 80
Discharge: HOME | End: 2020-01-27
Payer: MEDICARE

## 2020-01-27 PROCEDURE — 97110 THERAPEUTIC EXERCISES: CPT

## 2020-01-27 PROCEDURE — 97140 MANUAL THERAPY 1/> REGIONS: CPT

## 2020-01-27 NOTE — PROGRESS NOTES
PHYSICAL THERAPY - DAILY TREATMENT NOTE    Patient Name: Jelena Álvarez        Date: 2020  : 1940   yes Patient  Verified  Visit #:     Insurance: Payor: [employer] / Plan: [place] MEDICARE PART A & B / Product Type: Medicare /      In time: 9:18 Out time: 10:27   Total Treatment Time: 69     Medicare/BCBS Time Tracking (below)   Total Timed Codes (min):  59 1:1 Treatment Time:  39     TREATMENT AREA =  Leg pain, right [M79.604]    SUBJECTIVE  Pain Level (on 0 to 10 scale):  3  / 10   Medication Changes/New allergies or changes in medical history, any new surgeries or procedures?    no  If yes, update Summary List   Subjective Functional Status/Changes:  []  No changes reported     Pt states had trouble sleeping over the weekend due to his hip pain. Also states his pain with sitting continues to limit tolerance for driving; \"by the time I got here today I couldn't have sat another minute\"   Pt states he feels PT provides relief for ~ 24 hours, but then back to his baseline.        OBJECTIVE  Modalities Rationale:    decrease pain and increase tissue extensibility to improve the patients ability to improve functional abilities   min [] Estim, type/location:                                      []  att     []  unatt     []  w/US     []  w/ice    []  w/heat    min []  Mechanical Traction: type/lbs                   []  pro   []  sup   []  int   []  cont    []  before manual    []  after manual    min []  Ultrasound, settings/location:      min []  Iontophoresis w/ dexamethasone, location:                                               []  take home patch       []  in clinic   10 min []  Ice     [x]  Heat    location/position: S/l to R glute    min []  Vasopneumatic Device, press/temp:     min []  Other:    [] Skin assessment post-treatment (if applicable):    []  intact    []  redness- no adverse reaction     []redness - adverse reaction:        31 min Therapeutic Exercise:  [x]  See flow sheet   Rationale:    increase ROM and increase strength to improve the patients ability to improve functional abilities     8 min Manual Therapy: STM R proximal hamstrings, piriformis, GM. Manual psoas, quad stretch in s/l. Rationale:   decrease pain, increase ROM, increase tissue extensibility and decrease trigger points to improve functional mobility         Billed With/As:   [] TE   [] TA   [] Neuro   [] Self Care Patient Education: [x] Review HEP    [] Progressed/Changed HEP based on:   [] positioning   [] body mechanics   [] transfers   [] heat/ice application    [] other:      Other Objective/Functional Measures:  SI check:  level    -progressed to standing band mini squat     Post Treatment Pain Level (on 0 to 10) scale:   0  / 10     ASSESSMENT  Assessment/Changes in Function:     See d/c           Progress toward goals / Updated goals:    See d/c       PLAN  []  Upgrade activities as tolerated no Continue plan of care   []  Discharge due to :    []  Other:      Therapist: Fred Piper.  Joselyn Hanley, PT    Date: 1/27/2020 Time: 9:11 AM     Future Appointments   Date Time Provider Dae Davalos   1/27/2020  9:30 AM René Esparza., PT [facility] [id number]   1/30/2020 10:45 AM Eva Bustamante, DANIELLE [facility] [id number]

## 2020-01-27 NOTE — PROGRESS NOTES
[address] [place] [address] PHYSICAL THERAPY  [place] [address], [place], [address] - Phone: [phone]  Fax: [phone] FOR PHYSICAL THERAPY          Patient Name: Elsie Rucker : 1940   Treatment/Medical Diagnosis: Leg pain, right [M79.604]   Onset Date: 2019    Referral Source: Timoteo Parker MD [facility]): 19   Prior Hospitalization: See Medical History Provider #: [id number]   Prior Level of Function: Unlimited tolerance for sitting/standing/driving   Comorbidities: Arthritis, R TKA 2019, L1-L5 fusion 2013, L TKA 1997, L5-S1 fusion   Medications: Verified on Patient Summary List   Visits from [facility]: 11 Missed Visits: 0     Goal/Measure of Progress Goal Met? 1. Pt will note pain less than or equal to 5/10 at worst to allow improved tolerance for driving. Status at last Eval: 9/10 at the worst with prolonged sitting especially with driving >60-87 minutes Current Status: 9/10 (15 min of sitting) no   2. Pt will report at least 25% improvement in frequency/intensity of right LE radicular symptoms with ADL's.    Status at last Eval: ; No improvement, actually worse with periods of prolonged sitting >10-15 minutes Current Status: 5% no   3. Pt will improve FOTO score to >/= 50 to demo a significant improvement in functional activity tolerance   Status at last Eval: 38 (SOC)  48 () Current Status: 49 no     4. Pt will achieve >/= +5 GROC in order to promote increased activity tolerance. Status at last Eval: +2 () Current Status: +2 no   5. Pt will demonstrate bridge to 100% for improved axial stability with standing ADL's   Status at last Eval: 75% Current Status: 75% no     Key Functional Changes/Progress: Pt reports overall improvement as 10% with reduced sx's since [facility]. Pt treatment included manual therapy for SI joint correction, soft tissue mobilizations to L/S and R hip and manual stretching.   Pt was progressed through therapeutic exercises for increased core stability, and LE flexibility. Pt reports average daily pain ~ 3-4/10 with max pain up to 9/10 (sitting/sleeping). Pt c/o ongoing pain at night; last night he was up 12-13 times due to hip pain. Also limited with sitting tolerance with sitting 15 min causing increase to ~ 9/10 pain level which is relieved with standing/walking. Unfortunately, at this time pt has failed to show significant improvement in sx's and is recommended to return to MD for further medical recommendations. Pt is independent with appropriate long term HEP. Assessments/Recommendations: Discontinue therapy due to lack of appreciable progress towards goals. If you have any questions/comments please contact us directly at [phone]. Thank you for allowing us to assist in the care of your patient. Therapist Signature: Alex Aponte.  JULIA Solares Date: 01/27/20    Reporting Period: 12/19/19 to 01/27/20  Time: 9:40 AM

## 2020-01-30 ENCOUNTER — APPOINTMENT (OUTPATIENT)
Dept: PHYSICAL THERAPY | Age: 80
End: 2020-01-30
Payer: MEDICARE

## 2021-03-16 PROBLEM — I83.90 VARICOSE VEINS OF LOWER EXTREMITY: Status: ACTIVE | Noted: 2021-03-16
